# Patient Record
Sex: MALE | Employment: FULL TIME | URBAN - METROPOLITAN AREA
[De-identification: names, ages, dates, MRNs, and addresses within clinical notes are randomized per-mention and may not be internally consistent; named-entity substitution may affect disease eponyms.]

---

## 2020-02-12 ENCOUNTER — TELEMEDICINE2 (OUTPATIENT)
Dept: MEDICAL GROUP | Age: 45
End: 2020-02-12

## 2020-02-12 ENCOUNTER — TELEMEDICINE ORIGINATING SITE VISIT (OUTPATIENT)
Dept: MEDICAL GROUP | Facility: CLINIC | Age: 45
End: 2020-02-12

## 2020-02-12 VITALS
OXYGEN SATURATION: 95 % | SYSTOLIC BLOOD PRESSURE: 120 MMHG | HEART RATE: 60 BPM | RESPIRATION RATE: 16 BRPM | HEIGHT: 71 IN | WEIGHT: 184 LBS | DIASTOLIC BLOOD PRESSURE: 71 MMHG | TEMPERATURE: 98.5 F | BODY MASS INDEX: 25.76 KG/M2

## 2020-02-12 DIAGNOSIS — J02.9 PHARYNGITIS, UNSPECIFIED ETIOLOGY: ICD-10-CM

## 2020-02-12 DIAGNOSIS — J02.9 ACUTE PHARYNGITIS, UNSPECIFIED ETIOLOGY: ICD-10-CM

## 2020-02-12 PROCEDURE — 99203 OFFICE O/P NEW LOW 30 MIN: CPT | Performed by: INTERNAL MEDICINE

## 2020-02-12 RX ORDER — AMOXICILLIN 875 MG/1
875 TABLET, COATED ORAL 2 TIMES DAILY
Qty: 14 TAB | Refills: 0 | Status: SHIPPED | OUTPATIENT
Start: 2020-02-12

## 2020-02-12 SDOH — HEALTH STABILITY: MENTAL HEALTH: HOW OFTEN DO YOU HAVE A DRINK CONTAINING ALCOHOL?: 4 OR MORE TIMES A WEEK

## 2020-02-13 ASSESSMENT — ENCOUNTER SYMPTOMS
NAUSEA: 0
SINUS PAIN: 0
ORTHOPNEA: 0
PALPITATIONS: 0
CHILLS: 0
ABDOMINAL PAIN: 0
COUGH: 1
EYES NEGATIVE: 1
SPUTUM PRODUCTION: 0
SORE THROAT: 1
SHORTNESS OF BREATH: 0
VOMITING: 0
FEVER: 0
HEADACHES: 0
CARDIOVASCULAR NEGATIVE: 1
PND: 0
MYALGIAS: 1
DIARRHEA: 0
WEIGHT LOSS: 0
DIZZINESS: 0
GASTROINTESTINAL NEGATIVE: 1

## 2020-02-13 NOTE — PROGRESS NOTES
"Subjective:      Juan Georges is a 44 y.o. male who presents with Pharyngitis (cough, dry throat runny nose x 3 days getting worse)            HPI 44-year-old male is here today for acute medical issue.  Patient is currently working in [employer], originally from [place].  Patient will be working in [employer] for 3 months.  No PCP.    1.  Sore throat/pharyngitis  Patient with no PMH presents to the clinic with complaints of fatigue, sore throat, achiness, congestion over the last 3 days.  Patient has been taking over-the-counter cold remedies which has not helped.  Patient did not receive the flu shot this year.  Patient denies fevers chills, shortness of breath, chest pain, productive cough, headache, dizziness, ear pain.  Patient is concerned that his symptoms are not improving and concerned that he may miss work.    Review of Systems   Constitutional: Positive for malaise/fatigue. Negative for chills, fever and weight loss.   HENT: Positive for congestion and sore throat. Negative for ear pain and sinus pain.    Eyes: Negative.    Respiratory: Positive for cough. Negative for sputum production and shortness of breath.    Cardiovascular: Negative.  Negative for chest pain, palpitations, orthopnea, leg swelling and PND.   Gastrointestinal: Negative.  Negative for abdominal pain, diarrhea, nausea and vomiting.   Genitourinary: Negative.  Negative for frequency and urgency.   Musculoskeletal: Positive for myalgias.   Skin: Negative.  Negative for rash.   Neurological: Negative for dizziness and headaches.   All other systems reviewed and are negative.         Objective:     /71 (BP Location: Right arm, Patient Position: Sitting, BP Cuff Size: Adult)   Pulse 60   Temp 36.9 °C (98.5 °F) (Temporal)   Resp 16   Ht 1.791 m (5' 10.5\")   Wt 83.5 kg (184 lb)   SpO2 95%   BMI 26.03 kg/m²      Physical Exam  Vitals signs reviewed.   Constitutional:       General: He is not in acute distress.     Appearance: " Normal appearance. He is well-developed.   HENT:      Head: Normocephalic and atraumatic.      Right Ear: External ear normal.      Left Ear: External ear normal.      Mouth/Throat:      Mouth: Mucous membranes are moist.      Pharynx: Posterior oropharyngeal erythema present. No oropharyngeal exudate.   Eyes:      General: No scleral icterus.     Conjunctiva/sclera: Conjunctivae normal.      Pupils: Pupils are equal, round, and reactive to light.   Neck:      Musculoskeletal: Normal range of motion and neck supple.      Thyroid: No thyromegaly.   Cardiovascular:      Rate and Rhythm: Normal rate and regular rhythm.      Heart sounds: Normal heart sounds, S1 normal and S2 normal. No gallop.    Pulmonary:      Effort: Pulmonary effort is normal. No respiratory distress.      Breath sounds: Normal breath sounds. No wheezing, rhonchi or rales.   Abdominal:      General: Bowel sounds are normal. There is no distension.      Palpations: Abdomen is soft. There is no hepatomegaly or splenomegaly.      Tenderness: There is no abdominal tenderness.   Musculoskeletal: Normal range of motion.      Right lower leg: No edema.      Left lower leg: No edema.   Lymphadenopathy:      Cervical: No cervical adenopathy.   Skin:     General: Skin is warm and dry.   Neurological:      Mental Status: He is alert and oriented to person, place, and time.      Cranial Nerves: No cranial nerve deficit.      Sensory: No sensory deficit.      Gait: Gait normal.      Deep Tendon Reflexes: Reflexes are normal and symmetric.                 Assessment/Plan:       1. Acute pharyngitis, unspecified etiology  Recommend rest, OTC anti-inflammatories, salt water gargle, throat lozenge, hydration.  Patient in no acute distress, stable  Prescription provided for amoxicillin if symptoms do not resolve or worsen in any way.    RTC if symptoms do not improve or worsen in any way    - amoxicillin (AMOXIL) 875 MG tablet; Take 1 Tab by mouth 2 times a day.   Dispense: 14 Tab; Refill: 0